# Patient Record
Sex: FEMALE | Race: WHITE | Employment: OTHER | URBAN - METROPOLITAN AREA
[De-identification: names, ages, dates, MRNs, and addresses within clinical notes are randomized per-mention and may not be internally consistent; named-entity substitution may affect disease eponyms.]

---

## 2017-12-15 ENCOUNTER — PATIENT OUTREACH (OUTPATIENT)
Dept: CASE MANAGEMENT | Age: 67
End: 2017-12-15

## 2017-12-15 NOTE — PROGRESS NOTES
Chart reviewed per risk level 4 protocol. Recent fall with injury reported at last OV. Referral sent to [facility] director for fall education. This note will not be viewable in [address]

## 2018-05-08 PROBLEM — I10 ESSENTIAL HYPERTENSION: Status: ACTIVE | Noted: 2018-05-08

## 2018-05-08 PROBLEM — F33.9 RECURRENT DEPRESSION (HCC): Status: ACTIVE | Noted: 2018-05-08

## 2018-05-23 ENCOUNTER — HOSPITAL ENCOUNTER (OUTPATIENT)
Dept: CT IMAGING | Age: 68
Discharge: HOME | End: 2018-05-23
Attending: INTERNAL MEDICINE
Payer: MEDICARE

## 2018-05-23 VITALS — WEIGHT: 158 LBS | BODY MASS INDEX: 23.4 KG/M2 | HEIGHT: 69 IN

## 2018-05-23 DIAGNOSIS — Z12.2 ENCOUNTER FOR SCREENING FOR LUNG CANCER: ICD-10-CM

## 2018-05-23 DIAGNOSIS — F17.200 SMOKING: ICD-10-CM

## 2018-05-23 DIAGNOSIS — Z87.891 PERSONAL HISTORY OF TOBACCO USE, PRESENTING HAZARDS TO HEALTH: ICD-10-CM

## 2018-05-23 PROCEDURE — G0297 LDCT FOR LUNG CA SCREEN: HCPCS

## 2018-07-24 ENCOUNTER — HOSPITAL ENCOUNTER (OUTPATIENT)
Dept: MAMMOGRAPHY | Age: 68
Discharge: HOME | End: 2018-07-24
Attending: INTERNAL MEDICINE
Payer: MEDICARE

## 2018-07-24 DIAGNOSIS — Z78.0 MENOPAUSE: ICD-10-CM

## 2018-07-24 DIAGNOSIS — Z12.39 SCREENING FOR BREAST CANCER: ICD-10-CM

## 2018-07-24 PROCEDURE — 77080 DXA BONE DENSITY AXIAL: CPT

## 2018-07-24 PROCEDURE — 77067 SCR MAMMO BI INCL CAD: CPT

## 2018-10-15 ENCOUNTER — ANESTHESIA EVENT (OUTPATIENT)
Dept: ENDOSCOPY | Age: 68
End: 2018-10-15
Payer: MEDICARE

## 2018-10-15 ENCOUNTER — ANESTHESIA (OUTPATIENT)
Dept: ENDOSCOPY | Age: 68
End: 2018-10-15
Payer: MEDICARE

## 2018-10-15 ENCOUNTER — HOSPITAL ENCOUNTER (OUTPATIENT)
Age: 68
Setting detail: OUTPATIENT SURGERY
Discharge: HOME | End: 2018-10-15
Attending: INTERNAL MEDICINE | Admitting: INTERNAL MEDICINE
Payer: MEDICARE

## 2018-10-15 VITALS
WEIGHT: 155 LBS | BODY MASS INDEX: 22.96 KG/M2 | DIASTOLIC BLOOD PRESSURE: 59 MMHG | HEIGHT: 69 IN | OXYGEN SATURATION: 100 % | RESPIRATION RATE: 18 BRPM | TEMPERATURE: 98.5 F | SYSTOLIC BLOOD PRESSURE: 124 MMHG | HEART RATE: 61 BPM

## 2018-10-15 PROCEDURE — 74011250636 HC RX REV CODE- 250/636: Performed by: ANESTHESIOLOGY

## 2018-10-15 PROCEDURE — 76040000025: Performed by: INTERNAL MEDICINE

## 2018-10-15 PROCEDURE — 74011250636 HC RX REV CODE- 250/636

## 2018-10-15 PROCEDURE — 77030013991 HC SNR POLYP ENDOSC BSC -A: Performed by: INTERNAL MEDICINE

## 2018-10-15 PROCEDURE — 77030009426 HC FCPS BIOP ENDOSC BSC -B: Performed by: INTERNAL MEDICINE

## 2018-10-15 PROCEDURE — 74011000250 HC RX REV CODE- 250

## 2018-10-15 PROCEDURE — 88305 TISSUE EXAM BY PATHOLOGIST: CPT

## 2018-10-15 PROCEDURE — 76060000032 HC ANESTHESIA 0.5 TO 1 HR: Performed by: INTERNAL MEDICINE

## 2018-10-15 RX ORDER — SODIUM CHLORIDE 9 MG/ML
25 INJECTION, SOLUTION INTRAVENOUS CONTINUOUS
Status: DISCONTINUED | OUTPATIENT
Start: 2018-10-15 | End: 2018-10-15 | Stop reason: HOSPADM

## 2018-10-15 RX ORDER — LIDOCAINE HYDROCHLORIDE 20 MG/ML
INJECTION, SOLUTION EPIDURAL; INFILTRATION; INTRACAUDAL; PERINEURAL AS NEEDED
Status: DISCONTINUED | OUTPATIENT
Start: 2018-10-15 | End: 2018-10-15 | Stop reason: HOSPADM

## 2018-10-15 RX ORDER — SODIUM CHLORIDE, SODIUM LACTATE, POTASSIUM CHLORIDE, CALCIUM CHLORIDE 600; 310; 30; 20 MG/100ML; MG/100ML; MG/100ML; MG/100ML
75 INJECTION, SOLUTION INTRAVENOUS CONTINUOUS
Status: DISCONTINUED | OUTPATIENT
Start: 2018-10-15 | End: 2018-10-15 | Stop reason: HOSPADM

## 2018-10-15 RX ORDER — PROPOFOL 10 MG/ML
INJECTION, EMULSION INTRAVENOUS
Status: DISCONTINUED | OUTPATIENT
Start: 2018-10-15 | End: 2018-10-15 | Stop reason: HOSPADM

## 2018-10-15 RX ORDER — PROPOFOL 10 MG/ML
INJECTION, EMULSION INTRAVENOUS AS NEEDED
Status: DISCONTINUED | OUTPATIENT
Start: 2018-10-15 | End: 2018-10-15 | Stop reason: HOSPADM

## 2018-10-15 RX ADMIN — SODIUM CHLORIDE, SODIUM LACTATE, POTASSIUM CHLORIDE, AND CALCIUM CHLORIDE 75 ML/HR: 600; 310; 30; 20 INJECTION, SOLUTION INTRAVENOUS at 08:43

## 2018-10-15 RX ADMIN — PROPOFOL 50 MG: 10 INJECTION, EMULSION INTRAVENOUS at 08:52

## 2018-10-15 RX ADMIN — LIDOCAINE HYDROCHLORIDE 100 MG: 20 INJECTION, SOLUTION EPIDURAL; INFILTRATION; INTRACAUDAL; PERINEURAL at 08:52

## 2018-10-15 RX ADMIN — PROPOFOL 160 MCG/KG/MIN: 10 INJECTION, EMULSION INTRAVENOUS at 08:52

## 2018-10-15 NOTE — H&P
History and Physical 
 
 
Patient: Kayleigh Yoo Physician: Dustin Salazar MD 
 
Referring Physician: Skye Gee MD 
 
Chief Complaint: For colonoscopy History of Present Illness: Pt presents for colonoscopy. Pt with hx of polyps and Fhx of colon cancer. History: 
Past Medical History:  
Diagnosis Date  Adenomatous colon polyp 11/4/2013  Atheroembolism of left lower extremity ([address] 3/20/2013  Back pain 3/5/2013  Cervical spinal stenosis  Depression 3/5/2013  Elevated blood pressure 3/5/2013  Elevated blood pressure (not hypertension) 3/29/2013  Essential hypertension 5/8/2018  Femoral artery stenosis, left (HCC) 3/18/13  
 superficial  
 GERD (gastroesophageal reflux disease)  Hydrocephalus   
 shunt inserted 2005  Hyperglycemia 11/4/2013  Hyperlipidemia 3/29/2013  Hypernatremia 11/4/2013  Hypertension  Ischemia of foot 03/11/2013  Lumbar radiculopathy 3/5/2013  Memory loss 03/05/2013  Normal pressure hydrocephalus 3/20/2013  Osteopenia 11/4/2013  PAD (peripheral artery disease) ([address] 3/29/2013  Peripheral vascular disease ([address] 03/05/2013  Poor historian  Prediabetes 2/10/2014  Psychiatric disorder   
 anxiety and depression  Recurrent depression ([address] 5/8/2018  Smoking 3/5/2013  
 quit 3-11-13  Thromboembolus ([address]  Vitamin D deficiency 11/4/2013 Past Surgical History:  
Procedure Laterality Date  ENDOSCOPY, COLON, DIAGNOSTIC    
 HX OTHER SURGICAL  6/2013  
 right cubital tunnel  HX TONSILLECTOMY  NEUROLOGICAL PROCEDURE UNLISTED  2005  
 shunt inserted for hydrocephalus -  repeated in 2016  PARTIAL HIP REPLACEMENT Right  VASCULAR SURGERY PROCEDURE UNLIST Left 03/20/2013 L SFA endarterectomy Social History Social History  Marital status:  Spouse name: N/A  
 Number of children: N/A  
 Years of education: N/A Social History Main Topics  Smoking status: Current Every Day Smoker Packs/day: 0.50 Years: 48.00 Types: Cigarettes Start date: 1968  Smokeless tobacco: Never Used Comment: 50  Alcohol use 8.4 oz/week 14 Glasses of wine per week  Drug use: No  
 Sexual activity: Not on file Other Topics Concern  Not on file Social History Narrative Family History Problem Relation Age of Onset  Heart Disease Mother  Diabetes Mother  Stroke Mother  Heart Disease Father  Cancer Sister   
  colon Medications:  
Prior to Admission medications Medication Sig Start Date End Date Taking? Authorizing Provider  
alendronate (FOSAMAX) 70 mg tablet TAKE 1 TABLET BY MOUTH ONCE A WEEK ON TUESDAY 9/28/18   Candy Rollins MD  
ergocalciferol (ERGOCALCIFEROL) 50,000 unit capsule TAKE 1 TABLET BY MOUTH WEEKLY ON MONDAY 9/28/18   Candy Rollins MD  
losartan (COZAAR) 100 mg tablet Take 1 Tab by mouth daily. 5/8/18   Candy Rollins MD  
atorvastatin (LIPITOR) 40 mg tablet TAKE 1 TABLET BY MOUTH DAILY Patient taking differently: Take  by mouth nightly. TAKE 1 TABLET BY MOUTH DAILY 5/8/18   Candy Rollins MD  
famotidine (PEPCID) 20 mg tablet TAKE 1 TABLET BY MOUTH DAILY 5/8/18   Candy Rollins MD  
buPROPion SR [facility] SR) 150 mg SR tablet TAKE 1 TABLET BY MOUTH TWICE A DAY Patient taking differently: TAKE 1 TABLET BY MOUTH daily 3/16/18   Candy Rollins MD  
CALCIUM CARBONATE/VITAMIN D3 (CALTRATE 600 + D PO) Take  by mouth. Historical Provider  
aspirin 81 mg tablet Take 81 mg by mouth every morning. Take morning of surgery    Historical Provider Allergies: Allergies Allergen Reactions  Contrast Dye [Iodine] Hives Physical Exam:  
 
Vital Signs:  
Visit Vitals  Temp 97.9 °F (36.6 °C)  Ht 5' 9\" (1.753 m)  Wt 70.3 kg (155 lb)  BMI 22.89 kg/m2 Mordecai Trimble General: no distress Heart: regular Lungs: unlabored Abdominal: soft Neurological: Grossly normal  
  
 
Findings/Diagnosis: CRCS, Hx of polyps, Fhx of colon cancer Plan of Care/Planned Procedure: Colonoscopy, possible polypectomy. Pt/designee agree to proceed. Signed: 
Montana Peralta MD 
 10/15/2018

## 2018-10-15 NOTE — ANESTHESIA POSTPROCEDURE EVALUATION
Post-Anesthesia Evaluation and Assessment Patient: Anu Wolff MRN: [id number]  SSN: [id number] YOB: 1950  Age: 80 y.o. Sex: female Cardiovascular Function/Vital Signs Visit Vitals  /59  Pulse 61  Temp 36.9 °C (98.5 °F)  Resp 18  Ht 5' 9\" (1.753 m)  Wt 70.3 kg (155 lb)  SpO2 100%  BMI 22.89 kg/m2 Patient is status post total IV anesthesia anesthesia for Procedure(s): 
COLONOSCOPY / BMI 23 ENDOSCOPIC POLYPECTOMY. Nausea/Vomiting: None Postoperative hydration reviewed and adequate. Pain: 
Pain Scale 1: Numeric (0 - 10) (10/15/18 0944) Pain Intensity 1: 0 (10/15/18 0836) Managed Neurological Status: At baseline Mental Status and Level of Consciousness: Arousable Pulmonary Status:  
O2 Device: Room air (10/15/18 0944) Adequate oxygenation and airway patent Complications related to anesthesia: None Post-anesthesia assessment completed. No concerns Signed By: Collette Gu, MD   
 October 15, 2018

## 2018-10-15 NOTE — IP AVS SNAPSHOT
[address] 
[phone] Patient: Christiano Verdugo MRN: [id number] VWU:6/86/0400 About your hospitalization You were admitted on:  October 15, 2018 You last received care in the:  [facility] You were discharged on:  October 15, 2018 Why you were hospitalized Your primary diagnosis was:  Not on File Follow-up Information None Your Scheduled Appointments Tuesday November 13, 2018 11:00 AM EST Office Visit with Anjana Galeana MD  
[address]) [address] [address] [phone] Discharge Orders None A check evette indicates which time of day the medication should be taken. My Medications ASK your doctor about these medications Instructions Each Dose to Equal  
 Morning Noon Evening Bedtime  
 alendronate 70 mg tablet Commonly known as:  FOSAMAX Your last dose was: Your next dose is: TAKE 1 TABLET BY MOUTH ONCE A WEEK ON TUESDAY  
     
   
   
   
  
 aspirin 81 mg tablet Your last dose was: Your next dose is: Take 81 mg by mouth every morning. Take morning of surgery 81 mg  
    
   
   
   
  
 atorvastatin 40 mg tablet Commonly known as:  LIPITOR Your last dose was: Your next dose is: TAKE 1 TABLET BY MOUTH DAILY  
     
   
   
   
  
 buPROPion  mg SR tablet Commonly known as:  Dodson Skiff Your last dose was: Your next dose is: TAKE 1 TABLET BY MOUTH TWICE A DAY  
     
   
   
   
  
 CALTRATE 600 + D PO Your last dose was: Your next dose is: Take  by mouth.  
     
   
   
   
  
 ergocalciferol 50,000 unit capsule Commonly known as:  ERGOCALCIFEROL Your last dose was: Your next dose is: TAKE 1 TABLET BY MOUTH WEEKLY ON MONDAY  
     
   
   
   
  
 famotidine 20 mg tablet Commonly known as:  PEPCID Your last dose was: Your next dose is: TAKE 1 TABLET BY MOUTH DAILY losartan 100 mg tablet Commonly known as:  COZAAR Your last dose was: Your next dose is: Take 1 Tab by mouth daily. 100 mg Discharge Instructions Gastrointestinal Colonoscopy/Flexible Sigmoidoscopy - Lower Exam Discharge Instructions 1. Call Dr. Timothy Parish at [phone] for any problems or questions. 2. Contact the doctors office for follow up appointment as directed 3. Medication may cause drowsiness for several hours, therefore, do not drive or operate machinery for remainder of the day. 4. No alcohol today. 5. Ordinarily, you may resume regular diet and activity after exam unless otherwise specified by your physician. 6. Because of air put into your colon during exam, you may experience some abdominal distension, relieved by the passage of gas, for several hours. 7. Contact your physician if you have any of the following: 
a. Excessive amount of bleeding  large amount when having a bowel movement. Occasional specks of blood with bowel movement would not be unusual. 
b. Severe abdominal pain 
c. Fever or Chills Any additional instructions: Follow up as needed ACO Transitions of Care Introducing Replaced by [facility] Dorothea Reyna offers a voluntary care coordination program to provide high quality service and care to [facility] fee-for-service beneficiaries. Alessandra Herbster was designed to help you enhance your health and well-being through the following services: ? Transitions of Care  support for individuals who are transitioning from one care setting to another (example: Hospital to home). ? Chronic and Complex Care Coordination  support for individuals and caregivers of those with serious or chronic illnesses or with more than one chronic (ongoing) condition and those who take a number of different medications. If you meet specific medical criteria, a [facility] [address] may call you directly to coordinate your care with your primary care physician and your other care providers. For questions about the [facility] programs, please, contact your physicians office. For general questions or additional information about Accountable Care Organizations: 
Please visit www.medicare.gov/acos. html or call 1-800-MEDICARE (4-834.752.5151) TTY users should call 4-356.999.1494. Introducing [facility]! [facility] introduces clinovo patient portal. Now you can access parts of your medical record, email your doctor's office, and request medication refills online. 1. In your internet browser, go to [email or web address] 2. Click on the First Time User? Click Here link in the Sign In box. You will see the New Member Sign Up page. 3. Enter your clinovo Access Code exactly as it appears below. You will not need to use this code after youve completed the sign-up process. If you do not sign up before the expiration date, you must request a new code. · clinovo Access Code: [email or web address] Expires: 1/10/2019  3:32 PM 
 
4. Enter the last four digits of your Social Security Number (xxxx) and Date of Birth (mm/dd/yyyy) as indicated and click Submit. You will be taken to the next sign-up page. 5. Create a Sendoidt ID. This will be your clinovo login ID and cannot be changed, so think of one that is secure and easy to remember. 6. Create a clinovo password. You can change your password at any time. 7. Enter your Password Reset Question and Answer. This can be used at a later time if you forget your password. 8. Enter your e-mail address. You will receive e-mail notification when new information is available in [address] 9. Click Sign Up. You can now view and download portions of your medical record. 10. Click the Download Summary menu link to download a portable copy of your medical information. If you have questions, please visit the Frequently Asked Questions section of the [employer] website. Remember, [employer]idal is NOT to be used for urgent needs. For medical emergencies, dial 911. Now available from your iPhone and Android! Introducing Link Encarnacion As a [facility] patient, I wanted to make you aware of our electronic visit tool called Link Encarnacion. Draker/3D Forms allows you to connect within minutes with a medical provider 24 hours a day, seven days a week via a mobile device or tablet or logging into a secure website from your computer. You can access Link Encarnacion from anywhere in the [place]. A virtual visit might be right for you when you have a simple condition and feel like you just dont want to get out of bed, or cant get away from work for an appointment, when your regular [facility] [employer] [facility] provider is not available (evenings, weekends or holidays), or when youre out of town and need minor care. Electronic visits cost only $49 and if the Draker/3D Forms provider determines a prescription is needed to treat your condition, one can be electronically transmitted to a nearby pharmacy*. Please take a moment to enroll today if you have not already done so. The enrollment process is free and takes just a few minutes. To enroll, please download the Draker/3D Forms justian to your tablet or phone, or visit [email or web address] to enroll on your computer.    
And, as an [address] [place] [address] patient with a [employer] account, the results of your visits will be scanned into your electronic medical record and your primary care provider will be able to view the scanned results. We urge you to continue to see your regular [facility] provider for your ongoing medical care. And while your primary care provider may not be the one available when you seek a Link Encarnacion virtual visit, the peace of mind you get from getting a real diagnosis real time can be priceless. For more information on Link Redkatalinafin, view our Frequently Asked Questions (FAQs) at [email or web address]. Sincerely, 
 
Rajan Abdi MD 
Chief Medical Officer [facility] Dorothea Reyna *:  certain medications cannot be prescribed via Link Encarnacion Providers Seen During Your Hospitalization Provider Specialty Primary office phone Yordy Light MD Gastroenterology [phone] Your Primary Care Physician (PCP) Primary Care Physician Office Phone Office Fax Rowena Bird [address] [place] [address] [phone] You are allergic to the following Allergen Reactions Contrast Dye (Iodine) Hives Recent Documentation Height Weight BMI OB Status Smoking Status 1.753 m 70.3 kg 22.89 kg/m2 Postmenopausal Current Every Day Smoker Emergency Contacts Name Discharge Info Relation Home Work Mobile [facility] DISCHARGE CAREGIVER [3] Child [2] [phone] Patient Belongings The following personal items are in your possession at time of discharge: 
  Dental Appliances: None  Visual Aid: Glasses Please provide this summary of care documentation to your next provider. Signatures-by signing, you are acknowledging that this After Visit Summary has been reviewed with you and you have received a copy. Patient Signature:  ____________________________________________________________ Date:  ____________________________________________________________  
  
Daniela Castillo Provider Signature:  ____________________________________________________________ Date:  ____________________________________________________________

## 2018-10-15 NOTE — PROCEDURES
DATE OF PROCEDURE: 10/15/18    REQUESTING PHYSICIAN: Dr Odalis Kan: Colonoscopy. ENDOSCOPIST: Yumiko Garcia M.D. PREOPERATIVE DIAGNOSIS: CRCS, Hx of colon polyps, Fhx of colon cancer    POSTOPERATIVE DIAGNOSIS: Colon polyps, Internal hemorrhoids    SEDATION: MAC    INSTRUMENT: PCF H190    DESCRIPTION OF PROCEDURE:  After informed consent was obtained the patient was placed in the left lateral decubitus position. Intravenous sedation was administered as above. After adequate sedation had been achieved, digital rectal examination was performed. The colonoscope was then inserted through the anus and followed the course of the rectum and colon to the cecum, which was confirmed by visualization of the ileocecal valve and appendiceal orifice. The colonoscope was withdrawn from that point, performing a careful survey of the mucosa. Retroflexion was performed in the rectum. FINDINGS:  Normal appearing colonic mucosa from rectum to cecum without inflammation. 10mm proximal ascending polyp removed with hot snare. 7mm mid ascending polyp removed with cold snare. 5mm proximal transverse polyp removed with cold snare. Two mid transverse polyps (4mm) removed with bx forceps. Internal hemorrhoids seen on retroflexion. The prep was fair. Estimated Blood Loss:  0 cc-min    IMPRESSION:  Colon polyps  Internal hemorrhoids.     PLAN:  - F/u path  - Repeat colo in 3yrs with 2 day prep    YADIRA Echevarria MD

## 2018-10-15 NOTE — ROUTINE PROCESS
Pt. Discharged to car by Mau Talavera with family . Vital signs stable. Able to tolerate PO fluids. Passing gas. Seen by MD. 
 
Patient denies any pain and denies the need to be observed further. Patient states she is ready to go home and \"I am fine, I slide all the time at home. \" 
Patients personal wheelchair returned.

## 2018-10-15 NOTE — PROGRESS NOTES
Patient was dressing and called for help. Mariam Cole and myself went to assist pt. She was FDC on the bed and the floor. She stated she slid off the bed partially. Patient denies any pain in her lower extremities. No bruising, skin tear or abrasions noted. Assisted patient back into the bed and assisted with finishing up dressing. Patient states she \"slides all the time at home\". Instructed patient to call the MD office if new symptoms present. Verbalizes understanding. Maria C Patel director made aware. Dr. Сергей Schultz also aware.

## 2018-10-15 NOTE — DISCHARGE INSTRUCTIONS
Gastrointestinal Colonoscopy/Flexible Sigmoidoscopy - Lower Exam Discharge Instructions  1. Call Dr. Edith Alejo at [phone] for any problems or questions. 2. Contact the doctors office for follow up appointment as directed  3. Medication may cause drowsiness for several hours, therefore, do not drive or operate machinery for remainder of the day. 4. No alcohol today. 5. Ordinarily, you may resume regular diet and activity after exam unless otherwise specified by your physician. 6. Because of air put into your colon during exam, you may experience some abdominal distension, relieved by the passage of gas, for several hours. 7. Contact your physician if you have any of the following:  a. Excessive amount of bleeding - large amount when having a bowel movement. Occasional specks of blood with bowel movement would not be unusual.  b. Severe abdominal pain  c. Fever or Chills    Any additional instructions:   Follow up as needed

## 2018-10-15 NOTE — ANESTHESIA PREPROCEDURE EVALUATION
Anesthetic History Review of Systems / Medical History Patient summary reviewed Pulmonary Smoker (Former) Neuro/Psych Comments: Hydrocephalus,  shunt Cardiovascular Hypertension Exercise tolerance: >4 METS 
  
GI/Hepatic/Renal 
  
GERD Endo/Other Diabetes (\"pre\") Other Findings Comments: Hip Fracture Physical Exam 
 
Airway Mallampati: II 
TM Distance: > 6 cm Neck ROM: normal range of motion Mouth opening: Normal 
 
 Cardiovascular Regular rate and rhythm,  S1 and S2 normal,  no murmur, click, rub, or gallop Dental 
No notable dental hx Pulmonary Breath sounds clear to auscultation Abdominal 
 
 
 
 Other Findings Anesthetic Plan ASA: 3 Anesthesia type: total IV anesthesia Anesthetic plan and risks discussed with: Patient

## 2019-08-30 ENCOUNTER — HOSPITAL ENCOUNTER (EMERGENCY)
Age: 69
Discharge: HOME | End: 2019-08-30
Attending: EMERGENCY MEDICINE
Payer: MEDICARE

## 2019-08-30 ENCOUNTER — APPOINTMENT (OUTPATIENT)
Dept: GENERAL RADIOLOGY | Age: 69
End: 2019-08-30
Attending: EMERGENCY MEDICINE
Payer: MEDICARE

## 2019-08-30 VITALS
HEIGHT: 70 IN | SYSTOLIC BLOOD PRESSURE: 132 MMHG | HEART RATE: 78 BPM | DIASTOLIC BLOOD PRESSURE: 72 MMHG | BODY MASS INDEX: 20.52 KG/M2 | WEIGHT: 143.3 LBS | TEMPERATURE: 98.5 F | OXYGEN SATURATION: 98 % | RESPIRATION RATE: 18 BRPM

## 2019-08-30 DIAGNOSIS — S42.212A CLOSED DISPLACED FRACTURE OF SURGICAL NECK OF LEFT HUMERUS, UNSPECIFIED FRACTURE MORPHOLOGY, INITIAL ENCOUNTER: Primary | ICD-10-CM

## 2019-08-30 PROCEDURE — 99283 EMERGENCY DEPT VISIT LOW MDM: CPT | Performed by: EMERGENCY MEDICINE

## 2019-08-30 PROCEDURE — 73030 X-RAY EXAM OF SHOULDER: CPT

## 2019-08-30 PROCEDURE — 73060 X-RAY EXAM OF HUMERUS: CPT

## 2019-08-30 PROCEDURE — L3650 SO 8 ABD RESTRAINT PRE OTS: HCPCS

## 2019-08-30 RX ORDER — HYDROCODONE BITARTRATE AND ACETAMINOPHEN 5; 325 MG/1; MG/1
1 TABLET ORAL
Qty: 20 TAB | Refills: 0 | Status: SHIPPED | OUTPATIENT
Start: 2019-08-30 | End: 2019-09-02

## 2019-08-30 NOTE — DISCHARGE INSTRUCTIONS
Apply ice. Call orthopedics on Tuesday for follow up appointment within one week. Keep sling in place. Return for worsening or concerning symptoms.

## 2019-08-30 NOTE — ED TRIAGE NOTES
Pt has hydrocephalus and shunt in her head. Is wheelchair bound but was attempting to [facility]ies [facility]  and landed on left shoulder. No deformity seen. Has soreness in humerus. bp 122/70, hr 88. Rates pain 4-5/10. Arm in sling.

## 2019-08-30 NOTE — ED PROVIDER NOTES
52-year-old female tripped going up the steps with groceries in her hands at home, landing on her left arm. She did not hit her head. No loss of conscious. No headache, neck pain, chest pain or other injuries besides left shoulder. No numbness or tingling.            Past Medical History:   Diagnosis Date    Adenomatous colon polyp 11/4/2013    Atheroembolism of left lower extremity ([address] 3/20/2013    Back pain 3/5/2013    Cervical spinal stenosis     Depression 3/5/2013    Elevated blood pressure 3/5/2013    Elevated blood pressure (not hypertension) 3/29/2013    Essential hypertension 5/8/2018    Femoral artery stenosis, left (HCC) 3/18/13    superficial    GERD (gastroesophageal reflux disease)     Hydrocephalus     shunt inserted 2005    Hyperglycemia 11/4/2013    Hyperlipidemia 3/29/2013    Hypernatremia 11/4/2013    Hypertension     Ischemia of foot 03/11/2013    Lumbar radiculopathy 3/5/2013    Memory loss 03/05/2013    Normal pressure hydrocephalus 3/20/2013    Osteopenia 11/4/2013    PAD (peripheral artery disease) ([address] 3/29/2013    Peripheral vascular disease ([address] 03/05/2013    Poor historian     Prediabetes 2/10/2014    Psychiatric disorder     anxiety and depression    Recurrent depression ([address] 5/8/2018    Smoking 3/5/2013    quit 3-11-13    Thromboembolus ([address]     Vitamin D deficiency 11/4/2013       Past Surgical History:   Procedure Laterality Date    COLONOSCOPY N/A 10/15/2018    COLONOSCOPY / BMI 23 performed by Gena Fletcher MD at [facility] ENDOSCOPY    ENDOSCOPY, COLON, DIAGNOSTIC      HX OTHER SURGICAL  6/2013    right cubital tunnel    HX TONSILLECTOMY      NEUROLOGICAL PROCEDURE UNLISTED  2005    shunt inserted for hydrocephalus -  repeated in 2016   [address] Right     VASCULAR SURGERY PROCEDURE UNLIST Left 03/20/2013    L SFA endarterectomy         Family History:   Problem Relation Age of Onset    Heart Disease Mother     Diabetes Mother     Stroke Mother     Heart Disease Father     Cancer Sister         colon       Social History     Socioeconomic History    Marital status:      Spouse name: Not on file    Number of children: Not on file    Years of education: Not on file    Highest education level: Not on file   Occupational History    Not on file   Social Needs    Financial resource strain: Not on file    Food insecurity:     Worry: Not on file     Inability: Not on file    Transportation needs:     Medical: Not on file     Non-medical: Not on file   Tobacco Use    Smoking status: Current Every Day Smoker     Packs/day: 0.50     Years: 48.00     Pack years: 24.00     Types: Cigarettes     Start date: 1968    Smokeless tobacco: Never Used    Tobacco comment: 50   Substance and Sexual Activity    Alcohol use: Yes     Alcohol/week: 14.0 standard drinks     Types: 14 Glasses of wine per week    Drug use: No    Sexual activity: Not on file   Lifestyle    Physical activity:     Days per week: Not on file     Minutes per session: Not on file    Stress: Not on file   Relationships    Social connections:     Talks on phone: Not on file     Gets together: Not on file     Attends Restorationist service: Not on file     Active member of club or organization: Not on file     Attends meetings of clubs or organizations: Not on file     Relationship status: Not on file    Intimate partner violence:     Fear of current or ex partner: Not on file     Emotionally abused: Not on file     Physically abused: Not on file     Forced sexual activity: Not on file   Other Topics Concern    Not on file   Social History Narrative    Not on file         ALLERGIES: Contrast dye [iodine]    Review of Systems   Constitutional: Negative for fever. Eyes: Negative for visual disturbance. Musculoskeletal: Positive for arthralgias. Negative for back pain and neck pain. Skin: Negative for wound. Neurological: Negative for numbness. Psychiatric/Behavioral: Negative for confusion. Vitals:    08/30/19 1630 08/30/19 1916   BP: 124/83 132/72   Pulse: (!) 117 78   Resp: 18 18   Temp: 98.5 °F (36.9 °C) 98.5 °F (36.9 °C)   SpO2: 91% 98%   Weight: 65.8 kg (145 lb) 65 kg (143 lb 4.8 oz)   Height: 5' 10\" (1.778 m)             Physical Exam   Constitutional: She appears well-developed and well-nourished. HENT:   Head: Normocephalic and atraumatic. Eyes: EOM are normal.   Neck: Neck supple. Cardiovascular: Normal rate. Pulmonary/Chest: Effort normal.   Musculoskeletal:        Left shoulder: She exhibits decreased range of motion, tenderness, swelling and pain. She exhibits normal pulse. Left elbow: Normal.        Left wrist: Normal.   Neurological: She is alert. She has normal strength. No sensory deficit. Skin: Skin is dry. Psychiatric: She has a normal mood and affect. Nursing note and vitals reviewed. MDM  Number of Diagnoses or Management Options  Closed displaced fracture of surgical neck of left humerus, unspecified fracture morphology, initial encounter:   Diagnosis management comments: Parts of this document were created using dragon voice recognition software. The chart has been reviewed but errors may still be present. Placed in sling and swath. Given orthopedic follow-up. Does not want anything for pain at this time. I discussed the results of all labs, procedures, radiographs, and treatments with the patient and available family. Treatment plan is agreed upon and the patient is ready for discharge. Questions about treatment in the ED and differential diagnosis of presenting condition were answered. Patient was given verbal discharge instructions including, but not limited to, importance of returning to the emergency department for any concern of worsening or continued symptoms. Instructions were given to follow up with a primary care provider or specialist within 1-2 days.   Adverse effects of medications, if prescribed, were discussed and patient was advised to refrain from significant physical activity until followed up by primary care physician and to not drive or operate heavy machinery after taking any sedating substances.              Procedures

## 2019-09-04 ENCOUNTER — HOME HEALTH ADMISSION (OUTPATIENT)
Dept: HOME HEALTH SERVICES | Facility: HOME HEALTH | Age: 69
End: 2019-09-04

## 2019-09-04 NOTE — PROGRESS NOTES
[address]  Face to Face Encounter    Patients Name: Naima Luong    YOB: 1950    Ordering Physician: Luis Lucero    Primary Diagnosis: Closed displaced fracture of surgical neck  of left humerus,  Date of Face to Face:   8/30/19                                  Face to Face Encounter findings are related to primary reason for home care:   yes. 1. I certify that the patient needs intermittent care as follows: skilled nursing care:  skilled observation/assessment, patient education  physical therapy: strengthening, stretching/ROM, transfer training, gait/stair training and balance training  occupational therapy:  ADL safety (ie. cooking, bathing, dressing)    2. I certify that this patient is homebound, that is: 1) patient requires the use of a cane device, special transportation, or assistance of another to leave the home; or 2) patient's condition makes leaving the home medically contraindicated; and 3) patient has a normal inability to leave the home and leaving the home requires considerable and taxing effort. Patient may leave the home for infrequent and short duration for medical reasons, and occasional absences for non-medical reasons. Homebound status is due to the following functional limitations: Patient with strength deficits limiting the performance of all ADL's without caregiver assistance or the use of an assistive device. Patient with poor safety awareness and is at risk for falls without assistance of another person and the use of an assistive device. Patient with poor ambulation endurance limiting their safe ability to ascend/descend the required number of steps to leave the home. 3. I certify that this patient is under my care and that I, or a nurse practitioner or [facility], or clinical nurse specialist, or certified nurse midwife, working with me, had a Face-to-Face Encounter that meets the physician Face-to-Face Encounter requirements. The following are the clinical findings from the [address] [place] [address] encounter that support the need for skilled services and is a summary of the encounter: see progress notes    See attached progess note and summary of the patient's illness      Annie Cao RN  9/4/2019      THE FOLLOWING TO BE COMPLETED BY THE COMMUNITY PHYSICIAN:    I concur with the findings described above from the F2F encounter that this patient is homebound and in need of a skilled service.     Certifying Physician: _____________________________________      Printed Certifying Physician Name: _____________________________________    Date: _________________

## 2019-09-04 NOTE — PROGRESS NOTES
Izaiah Needs from Dr [place] office called stating that family requested home health after discharge from the ED due to difficulty caring for patient. Per Izaiah Dodson home health needs an order and a face to face from the ER but family is unable to get patient to their office to do a face to face. Discussed with Dr Dat Smith and home health orders and face to face entered in [facility] with her permission. Izaiah Needs notified and states [facility] and will call them to let them know face to face and orders have been entered.

## 2019-09-17 ENCOUNTER — HOSPITAL ENCOUNTER (OUTPATIENT)
Dept: LAB | Age: 69
Discharge: HOME | End: 2019-09-17
Payer: MEDICARE

## 2019-09-17 LAB — TSH SERPL DL<=0.005 MIU/L-ACNC: 2.7 UIU/ML (ref 0.36–3.74)

## 2019-09-17 PROCEDURE — 84443 ASSAY THYROID STIM HORMONE: CPT

## 2019-09-25 ENCOUNTER — APPOINTMENT (OUTPATIENT)
Dept: PHYSICAL THERAPY | Age: 69
End: 2019-09-25
Attending: INTERNAL MEDICINE

## 2020-01-16 ENCOUNTER — PATIENT OUTREACH (OUTPATIENT)
Dept: CASE MANAGEMENT | Age: 70
End: 2020-01-16

## 2020-01-30 ENCOUNTER — PATIENT OUTREACH (OUTPATIENT)
Dept: CASE MANAGEMENT | Age: 70
End: 2020-01-30

## 2020-01-30 NOTE — PROGRESS NOTES
Ambulatory Care Management  Follow up Outreach Note   Outreach type: Phone call: Yes     Date/Time of Outreach: 1/30/20, 0901     Reason for follow-up:   Continued outreach   Disease specific complaints/issues: None     Patient progress towards goals set from last contact:   Stable   Has patient attended any PCP or specialist follow-up appointments since last contact? What was outcome of appointment? When is next follow-up scheduled? 3/18 PCP, Dr Maria Del Carmen Curtis   Review medications. Any medication changes since last outreach? Does patient have any questions or issues related to their medications? Reports compliance w/ all meds, no questions, issues. Home health active? If yes  any issue? Progress? No, but requests PT to help w/ strengthening   Referrals needed?  (SW, Diabetes education, HH, etc. ) Yes, HH   Other issues/Miscellaneous? (Transportation, access to meals, ability to perform ADLs, adequate caregiver support, etc.)   Call to Dr Maria Del Carmen Curtis, message left re scheduling f/u appmt to [facility].  Pt denies any recent falls. Next Outreach Scheduled: Next week or before     Next Steps/Goals:   F/U   Ambulatory Care Manager/ LPN Care Coordinator: Christian Cooley RN     Call back from PCP office, appmt made for Tuesday 2/4 at [address] [place] [address]. Pt updated and agreeable, appreciative. Plan to f/u next week. This note will not be viewable in [address]

## 2020-02-12 ENCOUNTER — PATIENT OUTREACH (OUTPATIENT)
Dept: CASE MANAGEMENT | Age: 70
End: 2020-02-12

## 2020-02-12 NOTE — PROGRESS NOTES
Ambulatory Care Management  Follow up Outreach Note   Outreach type: Phone call: Yes     Date/Time of Outreach: 2/12/20, 2605     Reason for follow-up:   Continued outreach   Disease specific complaints/issues: None     Patient progress towards goals set from last contact:   None - pt has not been following HEP recs   Has patient attended any PCP or specialist follow-up appointments since last contact? What was outcome of appointment? When is next follow-up scheduled? No show to PCP appmt 2/4, states \"that slipped right by me\". 3/18 PCP, Blake Grant MD - encouraged attendance       Review medications. Any medication changes since last outreach? Does patient have any questions or issues related to their medications? Reports med compliance, no questions, issues   Home health active? If yes  any issue? Progress? No   Referrals needed?  (SW, Diabetes education, HH, etc. ) HH PT but pt needs F2F visit w/ PCP first   Other issues/Miscellaneous? (Transportation, access to meals, ability to perform ADLs, adequate caregiver support, etc.)   Denies any recent falls, encouraged HEP          Next Outreach Scheduled: 1-2 weeks     Next Steps/Goals:   F/U   Ambulatory Care Manager/ LPN Care Coordinator: Qamar Cali RN     Appreciative of contact. This note will not be viewable in [address]

## 2020-02-28 ENCOUNTER — PATIENT OUTREACH (OUTPATIENT)
Dept: CASE MANAGEMENT | Age: 70
End: 2020-02-28

## 2020-03-03 NOTE — PROGRESS NOTES
RNCM calls to pt, messages left, no return calls. Plan to f/u next week to encourage PCP f/u appmt attendance. This note will not be viewable in [address]

## 2020-03-19 ENCOUNTER — PATIENT OUTREACH (OUTPATIENT)
Dept: CASE MANAGEMENT | Age: 70
End: 2020-03-19

## 2020-03-19 NOTE — PROGRESS NOTES
Ambulatory Care Management  Follow up Outreach Note   Outreach type: Phone call: Yes   Date/Time of Outreach: 3/19/20, 0598     Reason for follow-up:   Continued outreach/ d/c   Disease specific complaints/issues: I Cancelled my MD appmt w/ Dr Betsy Nguyen due to the virus     Patient progress towards goals set from last contact:   Complete   Has patient attended any PCP or specialist follow-up appointments since last contact? What was outcome of appointment? When is next follow-up scheduled? Encouraged to reschedule PCP appmt as able   Review medications. Any medication changes since last outreach? Does patient have any questions or issues related to their medications? Reports compliance, no questions, issues   Home health active? If yes  any issue? Progress? No   Referrals needed?  (SW, Diabetes education, HH, etc. ) No   Other issues/Miscellaneous? (Transportation, access to meals, ability to perform ADLs, adequate caregiver support, etc.)   Reviewed infection control, social distancing and hand hygiene. Agreeable to d/c     Next Outreach Scheduled: None     Next Steps/Goals:   None   Ambulatory Care Manager/ LPN Care Coordinator: Daphney Griffin RN           This note will not be viewable in [address]

## 2020-05-01 NOTE — ED NOTES
I have reviewed discharge instructions with the patient. The patient verbalized understanding. Patient left ED via Discharge Method: wheelchair to Home with family    Opportunity for questions and clarification provided. Patient given 1 scripts. To continue your aftercare when you leave the hospital, you may receive an automated call from our care team to check in on how you are doing. This is a free service and part of our promise to provide the best care and service to meet your aftercare needs.  If you have questions, or wish to unsubscribe from this service please call [phone]. Thank you for Choosing our [employer] [facility]. Patient no longer has medicaid. Writer called to informed patient. Patient states that medicaid informed her she will have active insurance to May. Writer checked Forward portral and RTE was ran, response came back as termination date of 4/30/20. Patient then asked who she can contact, writer informed patient she can call the number back of her insurance card. She asked writer to provided for her, looking, there was no copy of Forward card. Unable to provide phone number for her. Patient hang up with a thanks.    Writer called back if she wants to keep her appointment. She asked for the out of pocket costs. Writer informed her writer does not know the exact cost. Patient hang up with \"Call me back when you find out.\"    Patient did not wait for writer to give her information where she can call to find out the out of pocket cost. Calling her twice already but not even completing conversation. With patient hanging up twice, writer will not call back to ask again if she wants to keep the appointment.

## (undated) DEVICE — CONTAINER PREFIL FRMLN 40ML --

## (undated) DEVICE — SYR 5ML 1/5 GRAD LL NSAF LF --

## (undated) DEVICE — REM POLYHESIVE ADULT PATIENT RETURN ELECTRODE: Brand: VALLEYLAB

## (undated) DEVICE — CANNULA NSL ORAL AD FOR CAPNOFLEX CO2 O2 AIRLFE

## (undated) DEVICE — NDL PRT INJ NSAF BLNT 18GX1.5 --

## (undated) DEVICE — FORCEPS BX L240CM JAW DIA2.8MM L CAP W/ NDL MIC MESH TOOTH

## (undated) DEVICE — CONNECTOR TBNG OD5-7MM O2 END DISP

## (undated) DEVICE — KENDALL RADIOLUCENT FOAM MONITORING ELECTRODE RECTANGULAR SHAPE: Brand: KENDALL

## (undated) DEVICE — SNARE POLYP SM W13MMXL240CM SHTH DIA2.4MM OVL FLX DISP

## (undated) DEVICE — SYR 3ML LL TIP 1/10ML GRAD --